# Patient Record
Sex: MALE | Race: WHITE
[De-identification: names, ages, dates, MRNs, and addresses within clinical notes are randomized per-mention and may not be internally consistent; named-entity substitution may affect disease eponyms.]

---

## 2022-10-04 ENCOUNTER — HOSPITAL ENCOUNTER (EMERGENCY)
Dept: HOSPITAL 54 - ER | Age: 82
Discharge: HOME | End: 2022-10-04
Payer: MEDICARE

## 2022-10-04 VITALS — SYSTOLIC BLOOD PRESSURE: 136 MMHG | DIASTOLIC BLOOD PRESSURE: 64 MMHG

## 2022-10-04 VITALS — WEIGHT: 254 LBS | HEIGHT: 68 IN | BODY MASS INDEX: 38.49 KG/M2
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
## 2022-10-04 DIAGNOSIS — I10: ICD-10-CM

## 2022-10-04 DIAGNOSIS — S42.351A: Primary | ICD-10-CM

## 2022-10-04 DIAGNOSIS — Z79.899: ICD-10-CM

## 2022-10-04 DIAGNOSIS — Z85.46: ICD-10-CM

## 2022-10-04 NOTE — NUR
BIB FAMILY C/O R SHOULDER PAIN S/P TRIP AND FALL AT 1800, - HEAD TRAUMA . ALERT 
AND ORINETED. RR EVEN AND NONLABORED. CONNECTED TO MONITOR